# Patient Record
Sex: MALE | Race: WHITE | URBAN - METROPOLITAN AREA
[De-identification: names, ages, dates, MRNs, and addresses within clinical notes are randomized per-mention and may not be internally consistent; named-entity substitution may affect disease eponyms.]

---

## 2021-06-29 ENCOUNTER — OFFICE VISIT (OUTPATIENT)
Dept: PRIMARY CARE CLINIC | Age: 26
End: 2021-06-29
Payer: COMMERCIAL

## 2021-06-29 VITALS
HEIGHT: 66 IN | BODY MASS INDEX: 26.52 KG/M2 | DIASTOLIC BLOOD PRESSURE: 85 MMHG | WEIGHT: 165 LBS | SYSTOLIC BLOOD PRESSURE: 146 MMHG | TEMPERATURE: 97.9 F

## 2021-06-29 DIAGNOSIS — R03.0 ELEVATED BP WITHOUT DIAGNOSIS OF HYPERTENSION: ICD-10-CM

## 2021-06-29 DIAGNOSIS — F32.A MODERATELY SEVERE DEPRESSION: Primary | ICD-10-CM

## 2021-06-29 PROCEDURE — 99202 OFFICE O/P NEW SF 15 MIN: CPT | Performed by: FAMILY MEDICINE

## 2021-06-29 RX ORDER — SERTRALINE HYDROCHLORIDE 25 MG/1
25 TABLET, FILM COATED ORAL DAILY
Qty: 30 TABLET | Refills: 2 | Status: SHIPPED | OUTPATIENT
Start: 2021-06-29 | End: 2021-07-14

## 2021-06-29 ASSESSMENT — COLUMBIA-SUICIDE SEVERITY RATING SCALE - C-SSRS
7. DID THIS OCCUR IN THE LAST THREE MONTHS: NO
5. HAVE YOU STARTED TO WORK OUT OR WORKED OUT THE DETAILS OF HOW TO KILL YOURSELF? DO YOU INTEND TO CARRY OUT THIS PLAN?: NO
3. HAVE YOU BEEN THINKING ABOUT HOW YOU MIGHT KILL YOURSELF?: NO
6. HAVE YOU EVER DONE ANYTHING, STARTED TO DO ANYTHING, OR PREPARED TO DO ANYTHING TO END YOUR LIFE?: YES
2. HAVE YOU ACTUALLY HAD ANY THOUGHTS OF KILLING YOURSELF?: YES
4. HAVE YOU HAD THESE THOUGHTS AND HAD SOME INTENTION OF ACTING ON THEM?: NO
1. WITHIN THE PAST MONTH, HAVE YOU WISHED YOU WERE DEAD OR WISHED YOU COULD GO TO SLEEP AND NOT WAKE UP?: YES

## 2021-06-29 ASSESSMENT — PATIENT HEALTH QUESTIONNAIRE - PHQ9
3. TROUBLE FALLING OR STAYING ASLEEP: 3
9. THOUGHTS THAT YOU WOULD BE BETTER OFF DEAD, OR OF HURTING YOURSELF: 2
8. MOVING OR SPEAKING SO SLOWLY THAT OTHER PEOPLE COULD HAVE NOTICED. OR THE OPPOSITE, BEING SO FIGETY OR RESTLESS THAT YOU HAVE BEEN MOVING AROUND A LOT MORE THAN USUAL: 3
10. IF YOU CHECKED OFF ANY PROBLEMS, HOW DIFFICULT HAVE THESE PROBLEMS MADE IT FOR YOU TO DO YOUR WORK, TAKE CARE OF THINGS AT HOME, OR GET ALONG WITH OTHER PEOPLE: 3
1. LITTLE INTEREST OR PLEASURE IN DOING THINGS: 1
6. FEELING BAD ABOUT YOURSELF - OR THAT YOU ARE A FAILURE OR HAVE LET YOURSELF OR YOUR FAMILY DOWN: 3
5. POOR APPETITE OR OVEREATING: 2
7. TROUBLE CONCENTRATING ON THINGS, SUCH AS READING THE NEWSPAPER OR WATCHING TELEVISION: 3
2. FEELING DOWN, DEPRESSED OR HOPELESS: 2
SUM OF ALL RESPONSES TO PHQ QUESTIONS 1-9: 22
4. FEELING TIRED OR HAVING LITTLE ENERGY: 3
SUM OF ALL RESPONSES TO PHQ9 QUESTIONS 1 & 2: 3
SUM OF ALL RESPONSES TO PHQ QUESTIONS 1-9: 22
SUM OF ALL RESPONSES TO PHQ QUESTIONS 1-9: 20

## 2021-06-29 NOTE — PROGRESS NOTES
[address] [facility] [address] PRIMARY CARE  [address] [place] [id number]  Dept: [phone]  Dept Fax: [phone]     6/29/2021      Sapphire Hernández   1995     Chief Complaint   Patient presents with    New Patient     Depression       HPI  Pt comes in today for c/o depression. Has had sx of depression since age 22. No inciting issues other than a breakup at the time. Symptoms have been getting progressively worse. Now affecting him daily and inability to keep his job. Some anxiety symptoms as well. Wanting to stay in bed all day. Lacking motivation to go to work. + fam hx of depression in mother, father and brother. + difficulty sleeping. Has never been seen for depression or treated previously. He has had thoughts of suicide at times, but no prior attempts and has nevere hurt himself previously. Currently working for [employer]. Has not been to work in about a week. At present has no plan or intent of hurting himself. Has a very good support system in place. Girlfriend is very in touch with what is going on and feels comfortable discussing these issues with her and his family members. PHQ-9 Total Score: 22 (6/29/2021  1:07 PM)  Thoughts that you would be better off dead, or of hurting yourself in some way: 2 (6/29/2021  1:07 PM)       Prior to Visit Medications    Not on File        Past Medical History:   Diagnosis Date    Depression         Social History     Tobacco Use    Smoking status: Never Smoker    Smokeless tobacco: Former User    Tobacco comment: vape pens casually   Substance Use Topics    Alcohol use: Yes     Comment: occ    Drug use: Not Currently     Comment: prior marijuana use        History reviewed. No pertinent surgical history.      No Known Allergies     Family History   Problem Relation Age of Onset    Mental Illness Mother     Heart Disease Father 50        MI    High Blood Pressure Father     Mental Illness Father     Cancer Brother Hodgkin's lymphoma    Mental Illness Brother     Heart Disease Maternal Grandmother     Breast Cancer Paternal Grandmother     Diabetes Paternal Grandmother         Patient's past medical history, surgical history, family history, medications, and allergies  were all reviewed and updated as appropriate today. Review of Systems   Respiratory: Negative for cough. Cardiovascular: Negative for chest pain. Psychiatric/Behavioral: Positive for dysphoric mood, sleep disturbance and suicidal ideas. Negative for self-injury. The patient is nervous/anxious. BP (!) 146/85   Temp 97.9 °F (36.6 °C)   Ht 5' 6\" (1.676 m)   Wt 165 lb (74.8 kg)   BMI 26.63 kg/m²      Physical Exam  Vitals reviewed. Constitutional:       General: He is not in acute distress. Appearance: He is well-developed. HENT:      Head: Normocephalic and atraumatic. Eyes:      Pupils: Pupils are equal, round, and reactive to light. Neck:      Thyroid: No thyromegaly. Cardiovascular:      Rate and Rhythm: Normal rate and regular rhythm. Heart sounds: No murmur heard. Pulmonary:      Effort: Pulmonary effort is normal. No respiratory distress. Breath sounds: Normal breath sounds. Abdominal:      General: Bowel sounds are normal. There is no distension. Palpations: Abdomen is soft. Tenderness: There is no abdominal tenderness. Musculoskeletal:      Cervical back: Neck supple. Lymphadenopathy:      Cervical: No cervical adenopathy. Skin:     General: Skin is warm and dry. Capillary Refill: Capillary refill takes less than 2 seconds. Neurological:      Mental Status: He is alert and oriented to person, place, and time. Psychiatric:         Mood and Affect: Mood normal.         Behavior: Behavior normal.         Judgment: Judgment normal.         Assessment:  Encounter Diagnoses   Name Primary?     Moderately severe depression (HCC) Yes    Elevated BP without diagnosis of hypertension Plan:    - > 30 minutes spent in direct face to face discussion with patient. ~ 7 year h/o depressed mood. Worsening over the last 2-3 months. Difficulty maintaining employment. Is planning to have paperwork faxed over from employer for completion. While patient has had suicidality in the past, he currently does not have and plan or intent to hurt himself. He endorses great support system and safety plan in place. Will trial sertraline 25mg with close follow up. Call for any worsening depression. Discussed possible side effects. Will plan for fasting physical at subsequent visit. New Prescriptions    SERTRALINE (ZOLOFT) 25 MG TABLET    Take 1 tablet by mouth daily        No orders of the defined types were placed in this encounter. Return in about 2 weeks (around 7/13/2021) for Mychart video visit - 2 week follow up depression.           [facility], DO

## 2021-06-30 PROBLEM — R03.0 ELEVATED BP WITHOUT DIAGNOSIS OF HYPERTENSION: Status: ACTIVE | Noted: 2021-06-30

## 2021-06-30 PROBLEM — F32.A MODERATELY SEVERE DEPRESSION: Status: ACTIVE | Noted: 2021-06-30

## 2021-06-30 ASSESSMENT — ENCOUNTER SYMPTOMS: COUGH: 0

## 2021-07-14 ENCOUNTER — TELEMEDICINE (OUTPATIENT)
Dept: PRIMARY CARE CLINIC | Age: 26
End: 2021-07-14
Payer: COMMERCIAL

## 2021-07-14 DIAGNOSIS — F32.A MODERATELY SEVERE DEPRESSION: Primary | ICD-10-CM

## 2021-07-14 PROCEDURE — 99213 OFFICE O/P EST LOW 20 MIN: CPT | Performed by: FAMILY MEDICINE

## 2021-07-14 NOTE — PROGRESS NOTES
[address] [facility] [address] PRIMARY CARE  [address] [place] [address]  Dept: [phone]  Dept Fax: [phone]     7/14/2021      Laura Cheng Edgar   1995     Chief Complaint   Patient presents with    Depression       HPI    Pt encounter today completed virtual visit using virtual platform. Services were provided through a video synchronous discussion virtually to substitute for in-person clinic visit. Patient and provider were located at their individual locations. Patient seen today for 2 week follow up depression. He was started on sertraline 25mg at last visit. Tolerating the medication. + headaches the first few days that has since resolved. He has not noticed much benefit in his depression. No worsening. Has been home from his job since I saw him. Was told he can't return until paperwork completed which he will be faxing over later today. Prior to Visit Medications    Medication Sig Taking? Authorizing Provider   sertraline (ZOLOFT) 25 MG tablet Take 1 tablet by mouth daily Yes [facility], DO       Past Medical History:   Diagnosis Date    Depression         Social History     Tobacco Use    Smoking status: Never Smoker    Smokeless tobacco: Former User    Tobacco comment: vape pens casually   Substance Use Topics    Alcohol use: Yes     Comment: occ    Drug use: Not Currently     Comment: prior marijuana use        History reviewed. No pertinent surgical history.      No Known Allergies     Family History   Problem Relation Age of Onset    Mental Illness Mother     Heart Disease Father 50        MI    High Blood Pressure Father     Mental Illness Father     Cancer Brother         Hodgkin's lymphoma    Mental Illness Brother     Heart Disease Maternal Grandmother     Breast Cancer Paternal Grandmother     Diabetes Paternal Grandmother         Patient's past medical history, surgical history, family history, medications, and allergies  were all reviewed and updated as appropriate today. Review of Systems   Constitutional: Negative for fever. Psychiatric/Behavioral: Positive for dysphoric mood. Vitals unable to obtain and physical exam is limited 2/2 virtual visit nature of this encounter. Physical Exam  Vitals reviewed. Constitutional:       General: He is not in acute distress. Appearance: Normal appearance. He is not ill-appearing. Eyes:      Conjunctiva/sclera: Conjunctivae normal.   Pulmonary:      Effort: Pulmonary effort is normal.   Musculoskeletal:      Cervical back: Neck supple. Neurological:      Mental Status: He is alert. Psychiatric:         Mood and Affect: Mood normal.         Assessment:  Encounter Diagnosis   Name Primary?  Moderately severe depression ([facility] [address] Yes       Plan:    - Dose increased to 50mg daily. Close follow up in 4 weeks for physical.     New Prescriptions    No medications on file        No orders of the defined types were placed in this encounter. Return in about 4 weeks (around 8/11/2021) for Physical.     Total time spent including virtual face to face consultation, chart review, coordination of care and documentation - 8092 DO Desmond Baileyshanta Lyles is a 31 y.o. male being evaluated by a Virtual Visit (video visit) encounter to address concerns as mentioned above. A caregiver was present when appropriate. Due to this being a TeleHealth encounter (During hospitalsYL-35 public health emergency), evaluation of the following organ systems was limited: Vitals/Constitutional/EENT/Resp/CV/GI//MS/Neuro/Skin/Heme-Lymph-Imm.   Pursuant to the emergency declaration under the [address] [place] [address], [address] [place] [address] authority and the [employer] and [employer] Act, this Virtual Visit was conducted with patient's (and/or legal guardian's) consent, to reduce the patient's risk of exposure to COVID-19 and provide necessary medical care. The patient (and/or legal guardian) has also been advised to contact this office for worsening conditions or problems, and seek emergency medical treatment and/or call 911 if deemed necessary.

## 2021-10-28 DIAGNOSIS — F32.A MODERATELY SEVERE DEPRESSION: ICD-10-CM

## 2022-11-09 ENCOUNTER — OFFICE VISIT (OUTPATIENT)
Dept: PRIMARY CARE CLINIC | Age: 27
End: 2022-11-09
Payer: COMMERCIAL

## 2022-11-09 VITALS
OXYGEN SATURATION: 99 % | DIASTOLIC BLOOD PRESSURE: 89 MMHG | SYSTOLIC BLOOD PRESSURE: 151 MMHG | HEART RATE: 94 BPM | BODY MASS INDEX: 27.66 KG/M2 | WEIGHT: 171.4 LBS | TEMPERATURE: 98.7 F

## 2022-11-09 DIAGNOSIS — R41.840 DIFFICULTY CONCENTRATING: ICD-10-CM

## 2022-11-09 DIAGNOSIS — F32.A MODERATELY SEVERE DEPRESSION: Primary | ICD-10-CM

## 2022-11-09 DIAGNOSIS — Z82.49 FAMILY HISTORY OF CORONARY ARTERY DISEASE IN FATHER: ICD-10-CM

## 2022-11-09 DIAGNOSIS — I10 ESSENTIAL HYPERTENSION: ICD-10-CM

## 2022-11-09 PROBLEM — R03.0 ELEVATED BP WITHOUT DIAGNOSIS OF HYPERTENSION: Status: RESOLVED | Noted: 2021-06-30 | Resolved: 2022-11-09

## 2022-11-09 PROCEDURE — 3075F SYST BP GE 130 - 139MM HG: CPT | Performed by: FAMILY MEDICINE

## 2022-11-09 PROCEDURE — 3079F DIAST BP 80-89 MM HG: CPT | Performed by: FAMILY MEDICINE

## 2022-11-09 PROCEDURE — 99214 OFFICE O/P EST MOD 30 MIN: CPT | Performed by: FAMILY MEDICINE

## 2022-11-09 RX ORDER — LOSARTAN POTASSIUM 25 MG/1
25 TABLET ORAL DAILY
Qty: 30 TABLET | Refills: 5 | Status: SHIPPED | OUTPATIENT
Start: 2022-11-09

## 2022-11-09 NOTE — PATIENT INSTRUCTIONS
[facility] Locations - No appointment necessary. @ indicates the location is open Saturdays in addition to Monday through Friday. Call your preferred location for test preparation, business hours and other information you need. [employer] accepts [employer]. [facility]    @ [place] [facility] [address] [facility] [address] [place] [address]. [place], [address]   Ph: [phone] [facility] Svcs. [address]   Ph: [phone]  @ [place] [facility] [address] [facility]   Ph: [phone]    [facility] Svcs. [address]ay[address]   Ph: [phone] @ [place] [facility] [address]  Ph: [phone] @ [facility] 835 [facility]. Cristof[address] Pr[address]   Ph: [phone]     Clint Devendra [facility]. [place] [address]  Ph: [phone]    [place]   @ [place] [facility] S[facility]. [address] [place] [address]   Ph: [phone] [place] [facility] [facility], [facility]  Ph: [address]:  [address] Lab Svcs[address] 54 [facility]   Ph: [phone] [facility]. Lab Svcs.   211 [facility], 1171 [facility]   Ph: [phone]

## 2022-11-09 NOTE — PROGRESS NOTES
[address] [facility] [address] PRIMARY CARE  [address] [place] [address]  Dept: [phone]  Dept Fax: [phone]     11/9/2022      Jay Hernández   1995     Chief Complaint   Patient presents with    Medication Refill       HPI    Pt comes in today for follow up depression. Reports sertraline is working very well since starting 2021. Has been using leftover rx from girlfriend. He would like refill to continue the medication. Reports concern of possible ADHD diagnosis. Has not pursued formal evaluation at this time. BP persistently elevated. Open to considering treatment for this now. He is starting to work on exercise and lifestyle modifications to hopefully bring it down. + family h/o HTN/CAD. No data recorded     Prior to Visit Medications    Medication Sig Taking? Authorizing Provider   sertraline (ZOLOFT) 50 MG tablet TAKE 1 TABLET BY MOUTH DAILY  Gissel Cardoso, DO        Past Medical History:   Diagnosis Date    Depression         Social History     Tobacco Use    Smoking status: Never    Smokeless tobacco: Former    Tobacco comments:     vape pens casually   Substance Use Topics    Alcohol use: Yes     Comment: occ    Drug use: Not Currently     Comment: prior marijuana use        No past surgical history on file. No Known Allergies     Family History   Problem Relation Age of Onset    Mental Illness Mother     Heart Disease Father 50        MI    High Blood Pressure Father     Mental Illness Father     Cancer Brother         Hodgkin's lymphoma    Mental Illness Brother     Heart Disease Maternal Grandmother     Breast Cancer Paternal Grandmother     Diabetes Paternal Grandmother         Patient's past medical history, surgical history, family history, medications, and allergies  were all reviewed and updated as appropriate today. Review of Systems   Constitutional:  Negative for fever. Respiratory:  Negative for cough.     Cardiovascular: Negative for chest pain, palpitations and leg swelling. Psychiatric/Behavioral:  Negative for dysphoric mood. BP (!) 151/89   Pulse 94   Temp 98.7 °F (37.1 °C)   Wt 171 lb 6.4 oz (77.7 kg)   SpO2 99%   BMI 27.66 kg/m²      Physical Exam  Vitals reviewed. Constitutional:       General: He is not in acute distress. Appearance: Normal appearance. He is not ill-appearing. Eyes:      Conjunctiva/sclera: Conjunctivae normal.   Cardiovascular:      Rate and Rhythm: Normal rate. Pulmonary:      Effort: Pulmonary effort is normal. No respiratory distress. Musculoskeletal:      Cervical back: Neck supple. Right lower leg: No edema. Left lower leg: No edema. Neurological:      Mental Status: He is alert. Psychiatric:         Mood and Affect: Mood normal.       Assessment:  Encounter Diagnoses   Name Primary? Moderately severe depression Yes    Difficulty concentrating     Essential hypertension     Family history of coronary artery disease in father        Plan:    - Depression well controlled on sertraline. Continue at current dose. - Will consider psychiatry evaluation re: possible ADHD.   - HTN. + fam hx. Start losartan. Check labs. Follow up for physical in 1-2 months. New Prescriptions    LOSARTAN (COZAAR) 25 MG TABLET    Take 1 tablet by mouth daily        Orders Placed This Encounter   Procedures    Comprehensive Metabolic Panel    Lipid, Fasting    TSH with Reflex        Return in about 4 weeks (around 12/7/2022) for Physical - non-fasting.      [address], DO

## 2022-11-12 ASSESSMENT — ENCOUNTER SYMPTOMS: COUGH: 0

## 2022-12-05 DIAGNOSIS — I10 ESSENTIAL HYPERTENSION: ICD-10-CM

## 2022-12-06 LAB
A/G RATIO: 1.7 (ref 1.1–2.2)
ALBUMIN SERPL-MCNC: 5.1 G/DL (ref 3.4–5)
ALP BLD-CCNC: 76 U/L (ref 40–129)
ALT SERPL-CCNC: 97 U/L (ref 10–40)
ANION GAP SERPL CALCULATED.3IONS-SCNC: 23 MMOL/L (ref 3–16)
AST SERPL-CCNC: 51 U/L (ref 15–37)
BILIRUB SERPL-MCNC: 1.8 MG/DL (ref 0–1)
BUN BLDV-MCNC: 10 MG/DL (ref 7–20)
CALCIUM SERPL-MCNC: 10.2 MG/DL (ref 8.3–10.6)
CHLORIDE BLD-SCNC: 100 MMOL/L (ref 99–110)
CHOLESTEROL, FASTING: 253 MG/DL (ref 0–199)
CO2: 21 MMOL/L (ref 21–32)
CREAT SERPL-MCNC: 0.9 MG/DL (ref 0.9–1.3)
GFR SERPL CREATININE-BSD FRML MDRD: >60 ML/MIN/{1.73_M2}
GLUCOSE BLD-MCNC: 76 MG/DL (ref 70–99)
HDLC SERPL-MCNC: 46 MG/DL (ref 40–60)
LDL CHOLESTEROL CALCULATED: 147 MG/DL
POTASSIUM SERPL-SCNC: 3.8 MMOL/L (ref 3.5–5.1)
SODIUM BLD-SCNC: 144 MMOL/L (ref 136–145)
TOTAL PROTEIN: 8.1 G/DL (ref 6.4–8.2)
TRIGLYCERIDE, FASTING: 298 MG/DL (ref 0–150)
TSH REFLEX: 1 UIU/ML (ref 0.27–4.2)
VLDLC SERPL CALC-MCNC: 60 MG/DL

## 2022-12-19 ENCOUNTER — OFFICE VISIT (OUTPATIENT)
Dept: PRIMARY CARE CLINIC | Age: 27
End: 2022-12-19
Payer: COMMERCIAL

## 2022-12-19 VITALS
HEART RATE: 74 BPM | OXYGEN SATURATION: 98 % | DIASTOLIC BLOOD PRESSURE: 75 MMHG | SYSTOLIC BLOOD PRESSURE: 124 MMHG | WEIGHT: 169.6 LBS | BODY MASS INDEX: 27.37 KG/M2

## 2022-12-19 DIAGNOSIS — R74.01 TRANSAMINITIS: ICD-10-CM

## 2022-12-19 DIAGNOSIS — I10 ESSENTIAL HYPERTENSION: ICD-10-CM

## 2022-12-19 DIAGNOSIS — Z82.49 FAMILY HISTORY OF CORONARY ARTERY DISEASE IN FATHER: ICD-10-CM

## 2022-12-19 DIAGNOSIS — Z23 ENCOUNTER FOR IMMUNIZATION: ICD-10-CM

## 2022-12-19 DIAGNOSIS — Z00.00 ROUTINE PHYSICAL EXAMINATION: Primary | ICD-10-CM

## 2022-12-19 DIAGNOSIS — E78.2 MIXED HYPERLIPIDEMIA: ICD-10-CM

## 2022-12-19 PROCEDURE — 99395 PREV VISIT EST AGE 18-39: CPT | Performed by: FAMILY MEDICINE

## 2022-12-19 PROCEDURE — 3078F DIAST BP <80 MM HG: CPT | Performed by: FAMILY MEDICINE

## 2022-12-19 PROCEDURE — 3074F SYST BP LT 130 MM HG: CPT | Performed by: FAMILY MEDICINE

## 2022-12-19 PROCEDURE — 90471 IMMUNIZATION ADMIN: CPT | Performed by: FAMILY MEDICINE

## 2022-12-19 PROCEDURE — 90715 TDAP VACCINE 7 YRS/> IM: CPT | Performed by: FAMILY MEDICINE

## 2022-12-19 SDOH — ECONOMIC STABILITY: FOOD INSECURITY: WITHIN THE PAST 12 MONTHS, THE FOOD YOU BOUGHT JUST DIDN'T LAST AND YOU DIDN'T HAVE MONEY TO GET MORE.: NEVER TRUE

## 2022-12-19 SDOH — ECONOMIC STABILITY: FOOD INSECURITY: WITHIN THE PAST 12 MONTHS, YOU WORRIED THAT YOUR FOOD WOULD RUN OUT BEFORE YOU GOT MONEY TO BUY MORE.: NEVER TRUE

## 2022-12-19 ASSESSMENT — PATIENT HEALTH QUESTIONNAIRE - PHQ9
8. MOVING OR SPEAKING SO SLOWLY THAT OTHER PEOPLE COULD HAVE NOTICED. OR THE OPPOSITE, BEING SO FIGETY OR RESTLESS THAT YOU HAVE BEEN MOVING AROUND A LOT MORE THAN USUAL: 0
3. TROUBLE FALLING OR STAYING ASLEEP: 2
1. LITTLE INTEREST OR PLEASURE IN DOING THINGS: 0
SUM OF ALL RESPONSES TO PHQ9 QUESTIONS 1 & 2: 0
SUM OF ALL RESPONSES TO PHQ QUESTIONS 1-9: 6
SUM OF ALL RESPONSES TO PHQ QUESTIONS 1-9: 6
2. FEELING DOWN, DEPRESSED OR HOPELESS: 0
SUM OF ALL RESPONSES TO PHQ QUESTIONS 1-9: 6
9. THOUGHTS THAT YOU WOULD BE BETTER OFF DEAD, OR OF HURTING YOURSELF: 0
4. FEELING TIRED OR HAVING LITTLE ENERGY: 2
6. FEELING BAD ABOUT YOURSELF - OR THAT YOU ARE A FAILURE OR HAVE LET YOURSELF OR YOUR FAMILY DOWN: 0
SUM OF ALL RESPONSES TO PHQ QUESTIONS 1-9: 6
7. TROUBLE CONCENTRATING ON THINGS, SUCH AS READING THE NEWSPAPER OR WATCHING TELEVISION: 2
10. IF YOU CHECKED OFF ANY PROBLEMS, HOW DIFFICULT HAVE THESE PROBLEMS MADE IT FOR YOU TO DO YOUR WORK, TAKE CARE OF THINGS AT HOME, OR GET ALONG WITH OTHER PEOPLE: 1
5. POOR APPETITE OR OVEREATING: 0

## 2022-12-19 ASSESSMENT — SOCIAL DETERMINANTS OF HEALTH (SDOH): HOW HARD IS IT FOR YOU TO PAY FOR THE VERY BASICS LIKE FOOD, HOUSING, MEDICAL CARE, AND HEATING?: NOT HARD AT ALL

## 2022-12-19 NOTE — PROGRESS NOTES
[address] [facility] [address] PRIMARY CARE  [address]  [address] [facility] [address]  Dept: [phone]  Dept Fax: [phone]     12/19/2022      Connie Hernández   1995     Chief Complaint   Patient presents with    Annual Exam       HPI    Pt comes in today for annual physical.     Heavy drinking 2 days prior to recent labs. Elevated LFTs on routine labs. Drinks primarily on the weekends socially, but that date was heavier than usual.     + fam hx of CAD/MI - dad with MI in late 45s. Heart disease on maternal side. BP elevated at last visit. Started on Losartan 25 mg due to persistent elevation. Doing well. No side effects. No data recorded       Prior to Visit Medications    Medication Sig Taking? Authorizing Provider   sertraline (ZOLOFT) 50 MG tablet Take 1 tablet by mouth daily  [address], DO   losartan (COZAAR) 25 MG tablet Take 1 tablet by mouth daily  [address], DO        Past Medical History:   Diagnosis Date    Depression     Hyperlipidemia     Hypertension         Social History     Tobacco Use    Smoking status: Never    Smokeless tobacco: Former    Tobacco comments:     vape pens casually   Substance Use Topics    Alcohol use: Yes     Comment: occ    Drug use: Not Currently     Comment: prior marijuana use        History reviewed. No pertinent surgical history. No Known Allergies     Family History   Problem Relation Age of Onset    Mental Illness Mother     Heart Disease Father 50        MI    High Blood Pressure Father     Mental Illness Father     Cancer Brother         Hodgkin's lymphoma    Mental Illness Brother     Heart Disease Maternal Grandmother     Breast Cancer Paternal Grandmother     Diabetes Paternal Grandmother         Patient's past medical history, surgical history, family history, medications, and allergies  were all reviewed and updated as appropriate today.     Review of Systems   Constitutional:  Negative for chills, fever and unexpected weight change. Respiratory:  Negative for cough and shortness of breath. Cardiovascular:  Negative for chest pain, palpitations and leg swelling. Gastrointestinal:  Negative for abdominal pain, diarrhea, nausea and vomiting. /75   Pulse 74   Wt 169 lb 9.6 oz (76.9 kg)   SpO2 98%   BMI 27.37 kg/m²      Physical Exam  Vitals reviewed. Constitutional:       General: He is not in acute distress. Appearance: He is well-developed. HENT:      Right Ear: Tympanic membrane normal.      Left Ear: Tympanic membrane normal.   Eyes:      General: No scleral icterus. Conjunctiva/sclera: Conjunctivae normal.   Neck:      Thyroid: No thyromegaly. Cardiovascular:      Rate and Rhythm: Normal rate and regular rhythm. Heart sounds: No murmur heard. Pulmonary:      Effort: Pulmonary effort is normal. No respiratory distress. Breath sounds: Normal breath sounds. Abdominal:      General: There is no distension. Palpations: Abdomen is soft. Tenderness: There is no abdominal tenderness. Musculoskeletal:      Cervical back: Neck supple. Right lower leg: No edema. Left lower leg: No edema. Lymphadenopathy:      Cervical: No cervical adenopathy. Skin:     General: Skin is warm and dry. Capillary Refill: Capillary refill takes less than 2 seconds. Neurological:      General: No focal deficit present. Mental Status: He is alert and oriented to person, place, and time. Mental status is at baseline. Psychiatric:         Mood and Affect: Mood normal.       Assessment:  Encounter Diagnoses   Name Primary? Routine physical examination Yes    Essential hypertension     Mixed hyperlipidemia     Transaminitis     Family history of coronary artery disease in father     Encounter for immunization        Plan:    - BP improved. Continue Losartan 25mg. - Cholesterol high. + fam hx. Advised heart healthy diet. Reduce etoh intake.  Regular exercise. - Repeat LFTs after 2 week cessation from etoh. - tdap given. Declined flu vaccine.        New Prescriptions    No medications on file        Orders Placed This Encounter   Procedures    Tdap, [address], (age 8 yrs+), IM    Hepatic Function Panel        Return in about 1 year (around 12/19/2023) for Fasting physical.         Karo Andres, DO

## 2022-12-24 ASSESSMENT — ENCOUNTER SYMPTOMS
DIARRHEA: 0
VOMITING: 0
COUGH: 0
SHORTNESS OF BREATH: 0
ABDOMINAL PAIN: 0
NAUSEA: 0